# Patient Record
Sex: MALE | Race: WHITE | NOT HISPANIC OR LATINO
[De-identification: names, ages, dates, MRNs, and addresses within clinical notes are randomized per-mention and may not be internally consistent; named-entity substitution may affect disease eponyms.]

---

## 2020-01-14 PROBLEM — Z00.00 ENCOUNTER FOR PREVENTIVE HEALTH EXAMINATION: Status: ACTIVE | Noted: 2020-01-14

## 2020-01-17 ENCOUNTER — APPOINTMENT (OUTPATIENT)
Dept: OTOLARYNGOLOGY | Facility: CLINIC | Age: 60
End: 2020-01-17
Payer: COMMERCIAL

## 2020-01-17 VITALS
HEIGHT: 73 IN | WEIGHT: 182 LBS | DIASTOLIC BLOOD PRESSURE: 77 MMHG | BODY MASS INDEX: 24.12 KG/M2 | SYSTOLIC BLOOD PRESSURE: 136 MMHG | HEART RATE: 69 BPM

## 2020-01-17 DIAGNOSIS — H93.11 TINNITUS, RIGHT EAR: ICD-10-CM

## 2020-01-17 DIAGNOSIS — H90.41 SENSORINEURAL HEARING LOSS, UNILATERAL, RIGHT EAR, WITH UNRESTRICTED HEARING ON THE CONTRALATERAL SIDE: ICD-10-CM

## 2020-01-17 PROCEDURE — 99244 OFF/OP CNSLTJ NEW/EST MOD 40: CPT | Mod: 25

## 2020-01-17 PROCEDURE — 92567 TYMPANOMETRY: CPT

## 2020-01-17 PROCEDURE — 92557 COMPREHENSIVE HEARING TEST: CPT

## 2020-01-17 NOTE — HISTORY OF PRESENT ILLNESS
[de-identified] : 59M with right sudden hearing loss that began in June 2019.  Hearing loss constant, nonfluctuating, moderately-severe.  Did MRI at [facility] in [place].  Had episode of dizziness in 2017 which prompted MRI in 2017.  Had another episode in 2018, subsequently diagnosed with atrial fibrillation.

## 2020-01-17 NOTE — REASON FOR VISIT
[Initial Evaluation] : an initial evaluation for [FreeTextEntry2] : c/o loss of hearing from right ear since  6/2019 last hearing test done 2 months ago

## 2020-01-17 NOTE — REVIEW OF SYSTEMS
[Hearing Loss] : hearing loss [Negative] : Endocrine [de-identified] : as per patient hearing loss in right ear

## 2020-01-17 NOTE — CONSULT LETTER
[FreeTextEntry2] : Scott Lieberman MD [FreeTextEntry1] : Dear Dr. Lieberman,\par \par Thank you very much for your consultation request regarding Terry Starr.  As you know, he is a very pleasant 59-year-old gentleman who first noticed right-sided hearing loss in June 2019.  He is unsure whether this was sudden onset.  He eventually followed up for ENT consultation in November.\par \par His otoscopic exam today is normal.  I reviewed a hearing test performed today as well as a hearing test from your office, both of which show a right mixed hearing loss with asymmetric bone conduction thresholds and small air bone gap.  Speech discrimination on today's hearing test is 64% in the right ear and 100% in the left ear.  I also reviewed a recent MRI report previously ordered by you, which shows no retrocochlear pathology.\par \par I agree with your assessment that Mr. Starr may have experienced a sudden hearing loss. We discussed potential etiologies of this condition and prognosis for recovery.  Unfortunately he is outside the treatment window where either oral or intratympanic steroids would be effective.  The mixed nature of his loss is difficult to explain, but I also do not think he is a candidate for surgical exploration given the small air bone gap.  We discussed options for management of his mixed hearing loss.  At this point he would likely be best served by a standard hearing aid, and he is not yet a candidate for cochlear implant given the degree of his residual hearing in this ear.  Unless he experiences a change in ear symptoms, I recommended monitoring his hearing annually.\par \par Thank you once again for the opportunity to participate in your patient's care.\par \par Best regards,\par \par Pankaj Gudino MD\par Otology/Neurotology\par Department of Otolaryngology\par [facility]

## 2020-01-17 NOTE — DATA REVIEWED
[de-identified] : I personally reviewed brain MRI images and the report from 2017, which showed no retrocochlear pathology.  MRI report from 2019 showed no IAC/retrocochlear pathology. [de-identified] : I personally reviewed the patient's audiogram, which shows right mixed HL with asymmetric bone conduction and small a-b gap, 64% word discrim, mild HF L SNHL with excellent word discrim.  Similar pattern of hearing loss noted on outside audio.\par

## 2020-01-31 ENCOUNTER — APPOINTMENT (OUTPATIENT)
Dept: PHARMACY | Facility: CLINIC | Age: 60
End: 2020-01-31

## 2020-03-10 ENCOUNTER — APPOINTMENT (OUTPATIENT)
Dept: PHARMACY | Facility: CLINIC | Age: 60
End: 2020-03-10
Payer: SELF-PAY

## 2020-03-10 PROCEDURE — V5010C: CUSTOM

## 2020-08-03 ENCOUNTER — APPOINTMENT (OUTPATIENT)
Dept: PHARMACY | Facility: CLINIC | Age: 60
End: 2020-08-03

## 2020-09-29 ENCOUNTER — APPOINTMENT (OUTPATIENT)
Dept: PHARMACY | Facility: CLINIC | Age: 60
End: 2020-09-29
Payer: SELF-PAY

## 2020-09-29 PROCEDURE — V5257A: CUSTOM

## 2020-10-20 ENCOUNTER — APPOINTMENT (OUTPATIENT)
Dept: PHARMACY | Facility: CLINIC | Age: 60
End: 2020-10-20
Payer: SELF-PAY

## 2020-10-20 PROCEDURE — V5299A: CUSTOM | Mod: NC

## 2020-11-06 ENCOUNTER — APPOINTMENT (OUTPATIENT)
Dept: PHARMACY | Facility: CLINIC | Age: 60
End: 2020-11-06
Payer: SELF-PAY

## 2020-11-06 PROCEDURE — V5299A: CUSTOM | Mod: NC
